# Patient Record
Sex: MALE | Race: WHITE | NOT HISPANIC OR LATINO | Employment: FULL TIME | URBAN - NONMETROPOLITAN AREA
[De-identification: names, ages, dates, MRNs, and addresses within clinical notes are randomized per-mention and may not be internally consistent; named-entity substitution may affect disease eponyms.]

---

## 2018-02-08 ENCOUNTER — DOCUMENTATION ONLY (OUTPATIENT)
Dept: FAMILY MEDICINE | Facility: OTHER | Age: 53
End: 2018-02-08

## 2018-02-08 PROBLEM — Z86.59 PERSONAL HISTORY OF OTHER MENTAL DISORDER: Status: ACTIVE | Noted: 2018-02-08

## 2018-02-08 PROBLEM — J30.9 ALLERGIC RHINITIS: Status: ACTIVE | Noted: 2018-02-08

## 2021-08-04 ENCOUNTER — TRANSFERRED RECORDS (OUTPATIENT)
Dept: HEALTH INFORMATION MANAGEMENT | Facility: CLINIC | Age: 56
End: 2021-08-04

## 2021-08-08 LAB
ALBUMIN UR-MCNC: NEGATIVE MG/DL
AMORPH CRY #/AREA URNS HPF: ABNORMAL /HPF
APPEARANCE UR: CLEAR
BACTERIA #/AREA URNS HPF: ABNORMAL /HPF
BILIRUB UR QL STRIP: NEGATIVE
COLOR UR AUTO: ABNORMAL
GLUCOSE UR STRIP-MCNC: 200 MG/DL
HGB UR QL STRIP: ABNORMAL
KETONES UR STRIP-MCNC: NEGATIVE MG/DL
LEUKOCYTE ESTERASE UR QL STRIP: NEGATIVE
MUCOUS THREADS #/AREA URNS LPF: PRESENT /LPF
NITRATE UR QL: NEGATIVE
PH UR STRIP: 5 [PH] (ref 5–7)
RBC URINE: 5 /HPF
SP GR UR STRIP: 1.01 (ref 1–1.03)
SQUAMOUS EPITHELIAL: <1 /HPF
UROBILINOGEN UR STRIP-MCNC: NORMAL MG/DL
WBC URINE: 6 /HPF

## 2021-08-08 PROCEDURE — 81001 URINALYSIS AUTO W/SCOPE: CPT | Performed by: EMERGENCY MEDICINE

## 2021-08-08 PROCEDURE — 99283 EMERGENCY DEPT VISIT LOW MDM: CPT

## 2021-08-09 ENCOUNTER — APPOINTMENT (OUTPATIENT)
Dept: GENERAL RADIOLOGY | Facility: CLINIC | Age: 56
End: 2021-08-09
Attending: EMERGENCY MEDICINE
Payer: COMMERCIAL

## 2021-08-09 ENCOUNTER — HOSPITAL ENCOUNTER (EMERGENCY)
Facility: CLINIC | Age: 56
Discharge: HOME | End: 2021-08-09
Attending: EMERGENCY MEDICINE | Admitting: EMERGENCY MEDICINE
Payer: COMMERCIAL

## 2021-08-09 VITALS
OXYGEN SATURATION: 94 % | DIASTOLIC BLOOD PRESSURE: 83 MMHG | HEART RATE: 90 BPM | RESPIRATION RATE: 18 BRPM | SYSTOLIC BLOOD PRESSURE: 124 MMHG | TEMPERATURE: 100 F

## 2021-08-09 DIAGNOSIS — U07.1 CLINICAL DIAGNOSIS OF COVID-19: ICD-10-CM

## 2021-08-09 DIAGNOSIS — R74.8 ELEVATED LIVER ENZYMES: ICD-10-CM

## 2021-08-09 DIAGNOSIS — N17.9 AKI (ACUTE KIDNEY INJURY) (H): ICD-10-CM

## 2021-08-09 LAB
ALBUMIN SERPL-MCNC: 3.8 G/DL (ref 3.4–5)
ALP SERPL-CCNC: 75 U/L (ref 40–150)
ALT SERPL W P-5'-P-CCNC: 95 U/L (ref 0–70)
ANION GAP SERPL CALCULATED.3IONS-SCNC: 7 MMOL/L (ref 3–14)
AST SERPL W P-5'-P-CCNC: 48 U/L (ref 0–45)
ATRIAL RATE - MUSE: 85 BPM
BASOPHILS # BLD AUTO: 0 10E3/UL (ref 0–0.2)
BASOPHILS NFR BLD AUTO: 1 %
BILIRUB SERPL-MCNC: 0.5 MG/DL (ref 0.2–1.3)
BUN SERPL-MCNC: 19 MG/DL (ref 7–30)
CALCIUM SERPL-MCNC: 9.2 MG/DL (ref 8.5–10.1)
CHLORIDE BLD-SCNC: 106 MMOL/L (ref 94–109)
CO2 SERPL-SCNC: 28 MMOL/L (ref 20–32)
CREAT SERPL-MCNC: 1.63 MG/DL (ref 0.66–1.25)
D DIMER PPP FEU-MCNC: 0.42 UG/ML FEU (ref 0–0.5)
DIASTOLIC BLOOD PRESSURE - MUSE: NORMAL MMHG
EOSINOPHIL # BLD AUTO: 0 10E3/UL (ref 0–0.7)
EOSINOPHIL NFR BLD AUTO: 1 %
ERYTHROCYTE [DISTWIDTH] IN BLOOD BY AUTOMATED COUNT: 12.6 % (ref 10–15)
GFR SERPL CREATININE-BSD FRML MDRD: 47 ML/MIN/1.73M2
GLUCOSE BLD-MCNC: 142 MG/DL (ref 70–99)
HCT VFR BLD AUTO: 48.5 % (ref 40–53)
HGB BLD-MCNC: 16.4 G/DL (ref 13.3–17.7)
IMM GRANULOCYTES # BLD: 0 10E3/UL
IMM GRANULOCYTES NFR BLD: 1 %
INTERPRETATION ECG - MUSE: NORMAL
LACTATE SERPL-SCNC: 1.3 MMOL/L (ref 0.7–2)
LIPASE SERPL-CCNC: 228 U/L (ref 73–393)
LYMPHOCYTES # BLD AUTO: 0.8 10E3/UL (ref 0.8–5.3)
LYMPHOCYTES NFR BLD AUTO: 24 %
MCH RBC QN AUTO: 31.2 PG (ref 26.5–33)
MCHC RBC AUTO-ENTMCNC: 33.8 G/DL (ref 31.5–36.5)
MCV RBC AUTO: 92 FL (ref 78–100)
MONOCYTES # BLD AUTO: 0.4 10E3/UL (ref 0–1.3)
MONOCYTES NFR BLD AUTO: 11 %
NEUTROPHILS # BLD AUTO: 2.2 10E3/UL (ref 1.6–8.3)
NEUTROPHILS NFR BLD AUTO: 62 %
NRBC # BLD AUTO: 0 10E3/UL
NRBC BLD AUTO-RTO: 0 /100
P AXIS - MUSE: 51 DEGREES
PLATELET # BLD AUTO: 93 10E3/UL (ref 150–450)
POTASSIUM BLD-SCNC: 4.1 MMOL/L (ref 3.4–5.3)
PR INTERVAL - MUSE: 152 MS
PROT SERPL-MCNC: 7.8 G/DL (ref 6.8–8.8)
QRS DURATION - MUSE: 88 MS
QT - MUSE: 340 MS
QTC - MUSE: 404 MS
R AXIS - MUSE: 57 DEGREES
RBC # BLD AUTO: 5.25 10E6/UL (ref 4.4–5.9)
SARS-COV-2 RNA RESP QL NAA+PROBE: POSITIVE
SODIUM SERPL-SCNC: 141 MMOL/L (ref 133–144)
SYSTOLIC BLOOD PRESSURE - MUSE: NORMAL MMHG
T AXIS - MUSE: 31 DEGREES
VENTRICULAR RATE- MUSE: 85 BPM
WBC # BLD AUTO: 3.5 10E3/UL (ref 4–11)

## 2021-08-09 PROCEDURE — 250N000013 HC RX MED GY IP 250 OP 250 PS 637: Performed by: EMERGENCY MEDICINE

## 2021-08-09 PROCEDURE — 83690 ASSAY OF LIPASE: CPT | Performed by: EMERGENCY MEDICINE

## 2021-08-09 PROCEDURE — 85025 COMPLETE CBC W/AUTO DIFF WBC: CPT | Performed by: EMERGENCY MEDICINE

## 2021-08-09 PROCEDURE — 83605 ASSAY OF LACTIC ACID: CPT | Performed by: EMERGENCY MEDICINE

## 2021-08-09 PROCEDURE — 71046 X-RAY EXAM CHEST 2 VIEWS: CPT

## 2021-08-09 PROCEDURE — 87635 SARS-COV-2 COVID-19 AMP PRB: CPT | Performed by: EMERGENCY MEDICINE

## 2021-08-09 PROCEDURE — 258N000003 HC RX IP 258 OP 636: Performed by: EMERGENCY MEDICINE

## 2021-08-09 PROCEDURE — 96374 THER/PROPH/DIAG INJ IV PUSH: CPT

## 2021-08-09 PROCEDURE — 36415 COLL VENOUS BLD VENIPUNCTURE: CPT | Performed by: EMERGENCY MEDICINE

## 2021-08-09 PROCEDURE — 84460 ALANINE AMINO (ALT) (SGPT): CPT | Performed by: EMERGENCY MEDICINE

## 2021-08-09 PROCEDURE — 93005 ELECTROCARDIOGRAM TRACING: CPT

## 2021-08-09 PROCEDURE — C9803 HOPD COVID-19 SPEC COLLECT: HCPCS

## 2021-08-09 PROCEDURE — 250N000011 HC RX IP 250 OP 636: Performed by: EMERGENCY MEDICINE

## 2021-08-09 PROCEDURE — 87040 BLOOD CULTURE FOR BACTERIA: CPT | Performed by: EMERGENCY MEDICINE

## 2021-08-09 PROCEDURE — 96361 HYDRATE IV INFUSION ADD-ON: CPT

## 2021-08-09 PROCEDURE — 85379 FIBRIN DEGRADATION QUANT: CPT | Performed by: EMERGENCY MEDICINE

## 2021-08-09 RX ORDER — ACETAMINOPHEN 325 MG/1
975 TABLET ORAL ONCE
Status: COMPLETED | OUTPATIENT
Start: 2021-08-09 | End: 2021-08-09

## 2021-08-09 RX ORDER — KETOROLAC TROMETHAMINE 15 MG/ML
10 INJECTION, SOLUTION INTRAMUSCULAR; INTRAVENOUS ONCE
Status: COMPLETED | OUTPATIENT
Start: 2021-08-09 | End: 2021-08-09

## 2021-08-09 RX ADMIN — ACETAMINOPHEN 975 MG: 325 TABLET, FILM COATED ORAL at 00:59

## 2021-08-09 RX ADMIN — KETOROLAC TROMETHAMINE 10 MG: 15 INJECTION, SOLUTION INTRAMUSCULAR; INTRAVENOUS at 00:58

## 2021-08-09 RX ADMIN — SODIUM CHLORIDE 1000 ML: 9 INJECTION, SOLUTION INTRAVENOUS at 00:56

## 2021-08-09 ASSESSMENT — ENCOUNTER SYMPTOMS
DIARRHEA: 1
FLANK PAIN: 1
FEVER: 1
VOMITING: 0
DYSURIA: 0
COUGH: 1
HEMATURIA: 0
BACK PAIN: 1
HEADACHES: 1

## 2021-08-09 NOTE — DISCHARGE INSTRUCTIONS
Discharge Instructions  COVID-19    COVID-19 is the disease caused by a new coronavirus. The virus spreads from person-to-person primarily by droplets when an infected person coughs or sneezes and the droplets are then breathed in by another person. There are tests available to diagnose COVID-19. You may have been diagnosed with COVID, may be being tested for COVID and have a pending test result, or may have been exposed to COVID.    Symptoms of COVID-19  Many people have no symptoms or mild symptoms.  Symptoms may usually appear 4 to 5 days (up to 14 days) after contact with a person with COVID-19. Some people will get severe symptoms and pneumonia. Usual symptoms are:     ? Fever  ? Cough  ? Trouble breathing    Less common symptoms are: Headache, body aches, sore throat, sneezing, diarrhea, loss of taste or smell.    Isolation and Quarantine    You may have been seen because you have symptoms, had an exposure, or had some other concern about possible COVID. The best way to stop the spread of the virus is to avoid contact with others.    Isolation refers to sick people staying away from people who are not sick. A person in quarantine is limiting activity because they were exposed and are waiting to see if they might become sick.    If you test positive for COVID, you should stay home (isolation) for at least 10 days after your symptoms began, and for 24 hours with no fever and improvement of symptoms--whichever is longer. (Your fever should be gone for 24 hours without using fever-reducing medicine). If you have no symptoms, you should stay home (isolation) for 10 days from the day of the test. If you have been vaccinated for COVID, the vaccination will not cause you to test positive so a positive test result generally is a  true positive .    For example, if you have a fever and cough for 6 days, you need to stay home 4 more days with no fever for a total of 10 days. Or, if you have a fever and cough for 10 days,  you need to stay home one more day with no fever for a total of 11 days.    If you have a high-risk exposure to COVID (you spent 15 minutes or more within six feet of somebody who has COVID), you should stay home (quarantine) for 14 days, unless you are vaccinated. Even if you test negative for COVID, the CDC recommends a 14-day quarantine from the time of your last exposure to that individual (unless you are vaccinated). There are options for a shortened (<14 day quarantine) you can review at:  [email or web address]/[email or web address]/close.html#long    If you live in the same house as somebody with COVID and cannot separate from them, you will need to quarantine for 14-days after that person's isolation (infectious) period. That means that you may need to quarantine for 24-days after that person became symptomatic/ill.    If you are vaccinated and do not develop symptoms, you do not need to quarantine after exposure.    If you have symptoms but a negative test, you should stay at home until you are symptom-free and without fever for 24 hours, using the same judgment you would for when it is safe to return to work/school from strep throat, influenza, or the common cold. If you worsen, you should consider being re-evaluated.    If you are being tested for COVID because of symptoms and your test is pending, you should stay home until you know your test result.    If I have COVID, how should I protect myself and others?    Do not go to work or school. Have a friend or relative do your shopping. Do not use public transportation (bus, train) or ridesharing (Lyft, Uber).    Separate yourself from other people in your home. As much as possible, you should stay in one room and away from other people in your home. Also, use a separate bathroom, if possible. Avoid handling pets or other animals while sick.     Wear a facemask if you need to be around other people and cover your mouth and nose with a tissue when  you cough or sneeze.     Avoid sharing personal household items. You should not share dishes, drinking glasses, forks/knives/spoons, towels, or bedding with other people in your home. After using these items, they should be washed with soap and water. Clean parts of your home that are touched often (doorknobs, faucets, countertops, etc.) daily.     Wash your hands often with soap and water for at least 20 seconds or use an alcohol-based hand  containing at least 60% alcohol.     Avoid touching your face.    Treat your symptoms. You can take Acetaminophen (Tylenol) to treat body aches and fever as needed for comfort. Ibuprofen (Advil or Motrin) can be used as well if you still have symptoms after taking Tylenol. Drink fluids. Rest.    Watch for worsening symptoms such as shortness of breath/difficulty breathing or very severe weakness.    Employers/workplaces are being asked by the Centers for Disease Control (CDC) to not request notes/documentation for you to return to work or prove that you were ill. You may choose to show your employer this paperwork. Also, repeat testing should not be required to return to work.    Exercise/Sports in rare cases, COVID could affect your heart in a way that makes exercise or participation in sports dangerous.    If you have a mild COVID illness (fever, cough, sore throat, and similar symptoms but no difficulty breathing or abnormalities of the lung): After your COVID symptoms have resolved, wait 14-days before returning to activity.  If you have more than a mild illness (meaning that you have problems with your breathing or lungs) or if you participate in competitive or strenuous activity or have a history of heart disease: Please see your primary doctor/provider prior to return to activity/competition.    Antibody treatments are available for patients with mild to moderate COVID illness in order to prevent severe illness. In general, only patients with risk factors for  severe illness are eligible for treatment. For more information, to see if you are eligible, and to find treatment, go to the [facility]:  [email or web address]     Return to the Emergency Department if:    If you are developing worsening breathing, shortness of breath, or feel worse you should seek medical attention.  If you are uncertain, contact your health care provider/clinic. If you need emergency medical attention, call 911 and tell them you have been ill.

## 2021-08-09 NOTE — ED TRIAGE NOTES
Patient presents to ED d/t R flank pain developed Thursday was in [place] and seen in ED for same c/o. Increased pain now radiating to lower abdomen. Fever, cough, diarrhea and HA.     Tested for COVID resulted negative    Took Tordaol at noon   Tylenol at 1700

## 2021-08-09 NOTE — ED PROVIDER NOTES
History   Chief Complaint:  Abdominal Pain and Flank Pain       HPI   Richard Knutson is a 56 year old male who presents with fever, cough, headache and bilateral flank pain for the past five days since Wednesday.  He noted he was in [place] when his symptoms began and he was seen in the emergency department in [place], receiving a chest x ray and CT scan that came back normal. See results below.  He also tested negative for Covid. He noted he was started on Augmentin but his symptoms have persisted. He also complained of bilateral back pain and multiple episodes of loose stools. Denied dysuria or hematuria or sick contacts. Denied vomiting or visual disturbance. He is not covid vaccinated.      8/4 CT Stone Protocol Results:  1. No urinary stones or obstruction.  2. Hepatic steatosis.  Reading per radiology    Review of Systems   Constitutional: Positive for fever.   Eyes: Negative for visual disturbance.   Respiratory: Positive for cough.    Gastrointestinal: Positive for diarrhea. Negative for vomiting.   Genitourinary: Positive for flank pain. Negative for dysuria and hematuria.   Musculoskeletal: Positive for back pain.   Neurological: Positive for headaches.   All other systems reviewed and are negative.        Allergies:  Apple  Other  Peanuts   Plum Pulp    Medications:  Vitamin D    Past Medical History:    Dysphagia  Gastritis  Hiatal hernia  Allergies  Mental disorder  Migraine  BPPV  ASNHL  Motion sickness     Family History:    Heart disease  Colon cancer    Social History:  Presents to the ED alone.  Recently in [place].    Physical Exam     Patient Vitals for the past 24 hrs:   BP Temp Pulse Resp SpO2   08/09/21 0230 124/83 -- 90 -- 94 %   08/09/21 0200 134/81 -- 88 -- 94 %   08/09/21 0150 125/79 -- 87 -- 94 %   08/09/21 0045 136/86 -- 92 -- 95 %   08/08/21 2142 (!) 140/81 100  F (37.8  C) 86 18 100 %       Physical Exam  Constitutional:  Oriented to person, place, and time. Nontoxic  appearing.  HENT:   Head:    Normocephalic.   Mouth/Throat:   Oropharynx is clear and moist.   Eyes:    EOM are normal. Pupils are equal, round, and reactive to light.   Neck:    Neck supple.   Cardiovascular:  Normal rate, regular rhythm and normal heart sounds.      Exam reveals no gallop and no friction rub.       No murmur heard.  Pulmonary/Chest:  Effort normal and breath sounds normal.      No respiratory distress. No wheezes. No rales.      No reproducible chest wall pain.  Abdominal:   No pain on percussion of flanks.  Musculoskeletal:  Normal range of motion.   Neurological:   Alert and oriented to person, place, and time.           Moves all 4 extremities spontaneously    Skin:    No rash noted. No tick bites.    Emergency Department Course   ECG  ECG taken at 107, ECG read at 107  Normal sinus rhythm  Normal ECG   Rate 85 bpm. AK interval 152 ms. QRS duration 88 ms. QT/QTc 340/404 ms. P-R-T axes 51 57 31.     Imaging:  Chest XR,  PA & LAT  IMPRESSION: No infiltrates or effusions. No pneumothorax. Minimal aortic calcification. Normal heart size and pulmonary vascularity. Minimal elevation right hemidiaphragm. Minor degenerative changes in the spine.  Reading per radiology    Laboratory:  CBC: WBC 3.5 (L), HGB 16.4, PLT 93 (L)     CMP: glucose: 142 (H), AST: 48 (H), GFR: 47 (L) o/w WNL (Creatinine 1.63 (H))     Lactic acid (result time 0042) 1.3     Lipase: 228    D dimer: 0.42    UA with microscopic: glucose: 200 (A), blood: moderate (A), bacteria: few (A), RBC: 5 (H), WBC: 6 (H), mucus: present (A), amorphous crystals: few (A) o/w WNL     Symptomatic COVID-19 Virus (Coronavirus) by PCR Nasopharyngeal swab: Positive (A)    Emergency Department Course:    Reviewed:  I reviewed nursing notes, vitals, past medical history and care everywhere    Assessments:  2330 I obtained history and examined the patient as noted above.   230 I rechecked the patient and explained findings.     Interventions:  0056 NS 1 L  IV  0058 Toradol 10 mg IV  0059 Tylenol 975 mg PO    Disposition:  The patient was discharged to home.       Impression & Plan     Medical Decision Making:  Richard Knutson is a 56 year old male who came in with cough, flank pain, myalgias, and headache. Workup was done in [place] with CT of pelvis with no obvious findings and negative Covid test. Here, he has continued symptoms. My differential includes pyelonephritis, renal colic, tick bite, Covid 19, pneumonia, and other causes. Covid test here is positive consistent with symptoms, although he has no obvious findings on chest x ray. With flank pain, I ordered a d dimer for possible PE which is negative. He is otherwise low risk. I see no need to repeat CT at this time. He does have mildly elevated creatinine and liver enzymes and has been told to discontinue Toradol in regards to this. At this time he is otherwise appropriate for outpatient management. He will be discharged home with pulse ox and will get a call back in get well loop. Patient will return with any worsening symptoms.       Covid-19  Richard Knutson was evaluated during a global COVID-19 pandemic, which necessitated consideration that the patient might be at risk for infection with the SARS-CoV-2 virus that causes COVID-19.   Applicable protocols for evaluation were followed during the patient's care.   COVID-19 was considered as part of the patient's evaluation. The plan for testing is:  a test was obtained during this visit.    Diagnosis:    ICD-10-CM    1. Clinical diagnosis of COVID-19  U07.1 COVID-19 GetWell Loop Referral   2. ERIN (acute kidney injury) (H)  N17.9    3. Elevated liver enzymes  R74.8        Scribe Disclosure:  Tara SWESNON, am serving as a scribe at 1:08 AM on 8/9/2021 to document services personally performed by Manjeet Baca MD based on my observations and the provider's statements to me.              Manjeet Baca MD  08/09/21 2154

## 2021-08-14 LAB
BACTERIA BLD CULT: NO GROWTH
BACTERIA BLD CULT: NO GROWTH

## 2024-06-01 ENCOUNTER — OFFICE VISIT (OUTPATIENT)
Dept: FAMILY MEDICINE | Facility: OTHER | Age: 59
End: 2024-06-01
Attending: NURSE PRACTITIONER
Payer: COMMERCIAL

## 2024-06-01 VITALS
HEIGHT: 65 IN | RESPIRATION RATE: 18 BRPM | HEART RATE: 72 BPM | BODY MASS INDEX: 25.18 KG/M2 | SYSTOLIC BLOOD PRESSURE: 120 MMHG | WEIGHT: 151.1 LBS | TEMPERATURE: 97 F | DIASTOLIC BLOOD PRESSURE: 90 MMHG | OXYGEN SATURATION: 97 %

## 2024-06-01 DIAGNOSIS — J20.9 ACUTE BRONCHITIS, UNSPECIFIED ORGANISM: Primary | ICD-10-CM

## 2024-06-01 DIAGNOSIS — J30.89 SEASONAL ALLERGIC RHINITIS DUE TO OTHER ALLERGIC TRIGGER: ICD-10-CM

## 2024-06-01 DIAGNOSIS — E11.9 TYPE 2 DIABETES MELLITUS WITHOUT COMPLICATION, WITHOUT LONG-TERM CURRENT USE OF INSULIN (H): ICD-10-CM

## 2024-06-01 DIAGNOSIS — R05.1 ACUTE COUGH: ICD-10-CM

## 2024-06-01 PROCEDURE — 99204 OFFICE O/P NEW MOD 45 MIN: CPT | Performed by: NURSE PRACTITIONER

## 2024-06-01 RX ORDER — MULTIVIT-MIN/IRON/FOLIC ACID/K 18-600-40
CAPSULE ORAL
COMMUNITY

## 2024-06-01 RX ORDER — ALBUTEROL SULFATE 90 UG/1
2 AEROSOL, METERED RESPIRATORY (INHALATION) EVERY 4 HOURS PRN
Qty: 18 G | Refills: 1 | Status: SHIPPED | OUTPATIENT
Start: 2024-06-01

## 2024-06-01 RX ORDER — BENZONATATE 100 MG/1
100 CAPSULE ORAL 3 TIMES DAILY PRN
Qty: 42 CAPSULE | Refills: 0 | Status: SHIPPED | OUTPATIENT
Start: 2024-06-01

## 2024-06-01 RX ORDER — PREDNISONE 20 MG/1
40 TABLET ORAL EVERY MORNING
Qty: 6 TABLET | Refills: 0 | Status: SHIPPED | OUTPATIENT
Start: 2024-06-01 | End: 2024-06-04

## 2024-06-01 ASSESSMENT — PAIN SCALES - GENERAL: PAINLEVEL: NO PAIN (0)

## 2024-06-01 NOTE — PATIENT INSTRUCTIONS
Adequate rest, staying hydrated with water and electrolyte replacements and symptomatic care is recommended for treatment of viral illness    -Over the counter remedies for upper respiratory symptoms. Please use caution with certain over the counter cough and cold medicine if you are on cardiac medications or blood thinners.    -Sinus congestion/pressure & runny nose   Flonase nasal spray, twice daily.  This is an intranasal steroid.  This will help with sinus congestion and postnasal drip. This is safe to use through the length of your symptoms.  Afrin nasal spray is also an option.  This is a little bit stronger. I would not recommend using this longer than 3 days as this and cause rebound congestion.  Consider picking up a daily nondrowsy antihistamine such as Allegra, Claritin or Zyrtec.  I would recommend taking this once daily for the next 3-5 days to help with symptoms. This can help with sinus drainage and  nasal congestion/pressure. Improving postnasal drip can help reduce sore throat irritation and reduce drainage causing a cough.    -Stay well hydrated. Push water. If you need to flavor the water that is ok.  Caffeine does not help with dehydration, this actually worsens dehydration.  Avoid pop and coffee.  -Adequate rest. Please allow your body adequate time to recover and heal from infection. Many viral illnesses last 7-10 days.   -Tylenol (acetaminophen) and NSAIDS (Aleve, Advil, ibuprofen) as needed for pain, discomfort, sore throat, headache, fever, chills or body aches.  Ibuprofen 400 mg-600 mg every 6 hours as needed pain/headache or fever.  Acetaminophen  (Tylenol) 500 mg or 650 mg every 4 hours.  For severe pain may use extra strength acetaminophen 1000 mg every 8 hours.   -Many viral illness last longer than a week, up to 10 Days. If you are having difficulty breathing, shortness of breath, wheezing, developing a fever again later in your illness, coughing up blood or tan colored sputum please  let us know.

## 2024-06-01 NOTE — PROGRESS NOTES
Richard Knutson  1965    ASSESSMENT/PLAN  1. Acute cough  with  2. Acute bronchitis, unspecified organism  - albuterol (PROAIR HFA/PROVENTIL HFA/VENTOLIN HFA) 108 (90 Base) MCG/ACT inhaler; Inhale 2 puffs into the lungs every 4 hours as needed for cough or shortness of breath  Dispense: 18 g; Refill: 1  - predniSONE (DELTASONE) 20 MG tablet; Take 2 tablets (40 mg) by mouth every morning for 3 days  Dispense: 6 tablet; Refill: 0  - benzonatate (TESSALON) 100 MG capsule; Take 1 capsule (100 mg) by mouth 3 times daily as needed for cough  Dispense: 42 capsule; Refill: 0    Patient appears well, nontoxic.  Lungs are clear to auscultation, no wheezing or rhonchi.  Vital signs are stable, I have low suspicion for secondary bacterial pneumonia, x-ray not indicated at this time.  Recommend treatment option for acute bronchitis secondary to recent URI.  I do suspect his seasonal allergies are contributing factor to his symptoms as well.  Stressed the importance of once daily nondrowsy histamine such as Claritin or Zyrtec.  I do think he would benefit from a prednisone burst 40 mg once daily for 3 days, patient is agreeable and was, adverse side effects reviewed together.  Prescription for albuterol inhaler and how to properly use this reviewed with patient as well as prescription for Tessalon Perles to use as needed for cough.  Patient is agreeable to plan of care.  If symptoms or not improving over the next 5 to 7 days, or worsening he knows to reach out for sooner evaluation.    3. Seasonal allergic rhinitis due to other allergic trigger  Recommend daily nondrowsy histamine for the next 7 to 10 days and through peak allergy season.  May use Flonase nasal spray twice daily as well.    4.  Type 2 diabetes mellitus without complication  Last hemoglobin A1c from 1 year ago was 6.4.  Managed with healthy lifestyle.  Not on diabetic medications.  Patient is due for follow-up however his PCP at [facility] has retired.  I do  recommend he establish with new PCP [facility] or [facility], provider list at [facility] provided to patient to schedule follow-up for annual physical.    Explanation of diagnosis, treatment options and risk and benefits of medications reviewed with patient. Patient agrees with plan of care.  All questions were answered.  Red flags symptoms were discussed and patient was advised when they should return for reevaluation or for prompt emergency evaluation.   Patient was given verbal and written instructions on plan of care. Instructions were printed or are available on Mychart on electronic AVS.     SUBJECTIVE  CHIEF COMPLAINT/ REASON FOR VISIT  Patient presents with:  Cough  Headache     HISTORY OF PRESENT ILLNESS  Richard Knutson is a pleasant 59 year old male presents to clinic today for evaluation for cough.  Patient states he feels like his lungs are irritated.  He has felt heaviness in his chest especially takes a deep breath for the past week.  He denies shortness of breath but just feels like he cannot breathe as easy.  His cough has been predominantly dry cough, and is persistent throughout the day.  He has had some headaches.  Patient denies any nasal congestion, sore throat or other URI symptoms, and otherwise feels fine.  He is concerned as he works in a slick environment as a .  Currently at his job site he is wearing a respirator mask.  He notes he has a history of allergies and in the past did require allergy injections.  He has not been on these for years but uses Claritin as needed.  He is not currently taking this.  No fever, chills or bodyaches.    History of diabetes mellitus, not on any diabetic medications.    I have reviewed the nursing notes.  I have reviewed allergies, medication list, problem list, and past medical history.    REVIEW OF SYSTEMS  Review of Systems   All other systems reviewed and are negative.       VITAL SIGNS  Vitals:    06/01/24 0959   BP: (!)  "120/90   BP Location: Left arm   Patient Position: Sitting   Cuff Size: Adult Regular   Pulse: 72   Resp: 18   Temp: 97  F (36.1  C)   TempSrc: Tympanic   SpO2: 97%   Weight: 68.5 kg (151 lb 1.6 oz)   Height: 1.66 m (5' 5.35\")      Body mass index is 24.87 kg/m .    OBJECTIVE  PHYSICAL EXAM  Physical Exam  Vitals reviewed.   Constitutional:       Appearance: Normal appearance. He is not ill-appearing or toxic-appearing.   HENT:      Head: Normocephalic and atraumatic.      Nose: Nose normal. No congestion or rhinorrhea.      Mouth/Throat:      Pharynx: No oropharyngeal exudate or posterior oropharyngeal erythema.   Eyes:      Conjunctiva/sclera: Conjunctivae normal.   Cardiovascular:      Rate and Rhythm: Normal rate and regular rhythm.      Pulses: Normal pulses.      Heart sounds: Normal heart sounds. No murmur heard.  Pulmonary:      Effort: Pulmonary effort is normal.      Breath sounds: Normal breath sounds. No wheezing or rhonchi.   Musculoskeletal:      Cervical back: Neck supple. No tenderness.   Lymphadenopathy:      Cervical: No cervical adenopathy.   Skin:     Findings: No rash.   Neurological:      General: No focal deficit present.      Mental Status: He is alert and oriented to person, place, and time.   Psychiatric:         Mood and Affect: Mood normal.         Behavior: Behavior normal.         Thought Content: Thought content normal.         Judgment: Judgment normal.          DIAGNOSTICS  No results found for any visits on 06/01/24.     Landy Raphael NP  [facility]  "

## 2024-06-01 NOTE — NURSING NOTE
"Chief Complaint   Patient presents with    Cough    Headache       Chest tightness and lungs not feeling normal per pateint.     Initial There were no vitals taken for this visit. Estimated body mass index is 23.89 kg/m  as calculated from the following:    Height as of 1/14/16: 1.676 m (5' 6\").    Weight as of 1/14/16: 67.1 kg (148 lb).  Medication Review: complete    The next two questions are to help us understand your food security.  If you are feeling you need any assistance in this area, we have resources available to support you today.           No data to display                  Health Care Directive:  Patient does not have a Health Care Directive or Living Will: Discussed advance care planning with patient; however, patient declined at this time.    Itzel Angeles    Chief Complaint   Patient presents with    Cough    Headache       Initial There were no vitals taken for this visit. Estimated body mass index is 23.89 kg/m  as calculated from the following:    Height as of 1/14/16: 1.676 m (5' 6\").    Weight as of 1/14/16: 67.1 kg (148 lb).  Medication Reconciliation: complete      FOOD SECURITY SCREENING QUESTIONS:    The next two questions are to help us understand your food security.  If you are feeling you need any assistance in this area, we have resources available to support you today.    Hunger Vital Signs:  Within the past 12 months we worried whether our food would run out before we got money to buy more. Never  Within the past 12 months the food we bought just didn't last and we didn't have money to get more. Never          Itzel Angeles RN.......6/1/20249:57 AM     "

## 2024-06-11 ENCOUNTER — APPOINTMENT (OUTPATIENT)
Dept: URGENT CARE | Facility: CLINIC | Age: 59
End: 2024-06-11

## 2024-06-11 ENCOUNTER — APPOINTMENT (OUTPATIENT)
Dept: LAB | Facility: CLINIC | Age: 59
End: 2024-06-11

## 2024-06-30 ENCOUNTER — OFFICE VISIT (OUTPATIENT)
Dept: FAMILY MEDICINE | Facility: OTHER | Age: 59
End: 2024-06-30
Payer: COMMERCIAL

## 2024-06-30 ENCOUNTER — HOSPITAL ENCOUNTER (OUTPATIENT)
Dept: GENERAL RADIOLOGY | Facility: OTHER | Age: 59
Discharge: HOME | End: 2024-06-30
Payer: COMMERCIAL

## 2024-06-30 VITALS
HEART RATE: 76 BPM | RESPIRATION RATE: 16 BRPM | SYSTOLIC BLOOD PRESSURE: 138 MMHG | WEIGHT: 153.1 LBS | OXYGEN SATURATION: 96 % | DIASTOLIC BLOOD PRESSURE: 80 MMHG | TEMPERATURE: 97.8 F | BODY MASS INDEX: 25.51 KG/M2 | HEIGHT: 65 IN

## 2024-06-30 DIAGNOSIS — J06.9 UPPER RESPIRATORY TRACT INFECTION, UNSPECIFIED TYPE: Primary | ICD-10-CM

## 2024-06-30 DIAGNOSIS — E11.9 TYPE 2 DIABETES MELLITUS WITHOUT COMPLICATION, WITHOUT LONG-TERM CURRENT USE OF INSULIN (H): ICD-10-CM

## 2024-06-30 DIAGNOSIS — G44.219 EPISODIC TENSION-TYPE HEADACHE, NOT INTRACTABLE: ICD-10-CM

## 2024-06-30 DIAGNOSIS — R09.89 CHEST CONGESTION: ICD-10-CM

## 2024-06-30 DIAGNOSIS — R05.1 ACUTE COUGH: ICD-10-CM

## 2024-06-30 LAB
ANION GAP SERPL CALCULATED.3IONS-SCNC: 10 MMOL/L (ref 7–15)
BASOPHILS # BLD AUTO: 0.1 10E3/UL (ref 0–0.2)
BASOPHILS NFR BLD AUTO: 1 %
BUN SERPL-MCNC: 16.4 MG/DL (ref 6–20)
CALCIUM SERPL-MCNC: 9.9 MG/DL (ref 8.6–10)
CHLORIDE SERPL-SCNC: 101 MMOL/L (ref 98–107)
CREAT SERPL-MCNC: 1 MG/DL (ref 0.67–1.17)
DEPRECATED HCO3 PLAS-SCNC: 26 MMOL/L (ref 22–29)
EGFRCR SERPLBLD CKD-EPI 2021: 87 ML/MIN/1.73M2
EOSINOPHIL # BLD AUTO: 0.6 10E3/UL (ref 0–0.7)
EOSINOPHIL NFR BLD AUTO: 8 %
ERYTHROCYTE [DISTWIDTH] IN BLOOD BY AUTOMATED COUNT: 12.9 % (ref 10–15)
GLUCOSE SERPL-MCNC: 153 MG/DL (ref 70–99)
HCT VFR BLD AUTO: 47.2 % (ref 40–53)
HGB BLD-MCNC: 16.1 G/DL (ref 13.3–17.7)
IMM GRANULOCYTES # BLD: 0 10E3/UL
IMM GRANULOCYTES NFR BLD: 0 %
LYMPHOCYTES # BLD AUTO: 2.3 10E3/UL (ref 0.8–5.3)
LYMPHOCYTES NFR BLD AUTO: 30 %
MCH RBC QN AUTO: 31.5 PG (ref 26.5–33)
MCHC RBC AUTO-ENTMCNC: 34.1 G/DL (ref 31.5–36.5)
MCV RBC AUTO: 92 FL (ref 78–100)
MONOCYTES # BLD AUTO: 0.7 10E3/UL (ref 0–1.3)
MONOCYTES NFR BLD AUTO: 9 %
NEUTROPHILS # BLD AUTO: 4 10E3/UL (ref 1.6–8.3)
NEUTROPHILS NFR BLD AUTO: 52 %
NRBC # BLD AUTO: 0 10E3/UL
NRBC BLD AUTO-RTO: 0 /100
PLATELET # BLD AUTO: 162 10E3/UL (ref 150–450)
POTASSIUM SERPL-SCNC: 4 MMOL/L (ref 3.4–5.3)
RBC # BLD AUTO: 5.11 10E6/UL (ref 4.4–5.9)
SODIUM SERPL-SCNC: 137 MMOL/L (ref 135–145)
WBC # BLD AUTO: 7.7 10E3/UL (ref 4–11)

## 2024-06-30 PROCEDURE — 80048 BASIC METABOLIC PNL TOTAL CA: CPT | Mod: ZL

## 2024-06-30 PROCEDURE — 85025 COMPLETE CBC W/AUTO DIFF WBC: CPT | Mod: ZL

## 2024-06-30 PROCEDURE — 71046 X-RAY EXAM CHEST 2 VIEWS: CPT

## 2024-06-30 PROCEDURE — 36415 COLL VENOUS BLD VENIPUNCTURE: CPT | Mod: ZL

## 2024-06-30 PROCEDURE — 99214 OFFICE O/P EST MOD 30 MIN: CPT

## 2024-06-30 RX ORDER — AZITHROMYCIN 250 MG/1
TABLET, FILM COATED ORAL
Qty: 6 TABLET | Refills: 0 | Status: SHIPPED | OUTPATIENT
Start: 2024-06-30 | End: 2024-07-05

## 2024-06-30 RX ORDER — AMOXICILLIN 500 MG
1200 CAPSULE ORAL DAILY
COMMUNITY

## 2024-06-30 ASSESSMENT — PAIN SCALES - GENERAL: PAINLEVEL: EXTREME PAIN (9)

## 2024-06-30 NOTE — PROGRESS NOTES
ASSESSMENT/PLAN:    I have reviewed the nursing notes.  I have reviewed the findings, diagnosis, plan and need for follow up with the patient.    1. Chest congestion  2. Acute cough  3. Episodic tension-type headache, not intractable    - XR Chest 2 Views- Clear chest xray per radiologist    - CBC with Platelets & Differential- Unremarkable results    - Basic Metabolic Panel- Unremarkable results other than an elevated glucose of 153.    4. Upper respiratory tract infection, unspecified type    - azithromycin (ZITHROMAX) 250 MG tablet; Take 2 tablets (500 mg) by mouth daily for 1 day, THEN 1 tablet (250 mg) daily for 4 days.  Dispense: 6 tablet; Refill: 0    - Please read the attached information on URI and cough for at home care treatment as discussed in the clinic today.    - Symptomatic treatment - Encouraged fluids, salt water gargles, honey (only if greater than 1 year in age due to risk of botulism), elevation, humidifier, sinus rinse/netti pot, lozenges, tea, topical vapor rub, popsicles, rest, etc     - May use over-the-counter Tylenol or ibuprofen as needed for pain, inflammation or fever.    5. Type 2 diabetes mellitus without complication, without long-term current use of insulin (H)    - Patient A1C on 7/13/24 was 6.4.  Patient is currently not taking an medication to control his diabetes.  Recommend follow up with PCP for plan to better manage his diabetes.      - Discussed warning signs/symptoms indicative of need to f/u    - Follow up if symptoms persist or worsen or concerns    - I explained my diagnostic considerations and recommendations to the patient, who voiced understanding and agreement with the treatment plan. All questions were answered. We discussed potential side effects of any prescribed or recommended therapies, as well as expectations for response to treatments.    Elba Garrett, DUY CNP  6/30/2024  4:52 PM    HPI:    Richard Knutson is a 59 year old male who presents to [facility]  today for concerns of productive cough with chest tightness.  Coughing up green sputum.  Has had symptoms since the beginning of June.  Was seen in  on 6/01/24.  States that he had gotten a little better a couple weeks ago but is now getting worse again. Denies fever, shortness of breath, chest pain, rhinorrhea, nasal congestion, headache, dizziness, otalgia, nausea, vomiting or diarrhea.     Past Medical History:   Diagnosis Date    Dysphagia 8/4/14    Epigastric pain 8/4/14    Gastritis 8/4/14    Hiatal hernia 8/4/14     History reviewed. No pertinent surgical history.  Social History     Tobacco Use    Smoking status: Never    Smokeless tobacco: Never   Substance Use Topics    Alcohol use: Not Currently     Current Outpatient Medications   Medication Sig Dispense Refill    albuterol (PROAIR HFA/PROVENTIL HFA/VENTOLIN HFA) 108 (90 Base) MCG/ACT inhaler Inhale 2 puffs into the lungs every 4 hours as needed for cough or shortness of breath 18 g 1    benzonatate (TESSALON) 100 MG capsule Take 1 capsule (100 mg) by mouth 3 times daily as needed for cough 42 capsule 0    Cholecalciferol (VITAMIN D) 50 MCG (2000 UT) CAPS Take  by mouth. 1 unit of Vitamin D equals 0.025 mcg of Vitamin D      IBUPROFEN PO Take 200 mg by mouth every 6 hours as needed for moderate pain      Multiple Vitamin (MULTIVITAMIN ADULT PO) Take 1 tablet by mouth daily      Omega-3 Fatty Acids (FISH OIL) 1200 MG capsule Take 1,200 mg by mouth daily      scopolamine (TRANSDERM) (1.5mg base/3day) patch Place 1 patch onto the skin every 72 hours.  Apply to hairless area behind one ear at least 4 hours before travel.  Remove old patch and change every 3 days. (Patient not taking: Reported on 6/30/2024) 2 patch 10     Allergies   Allergen Reactions    Food Anaphylaxis     Peanuts, raw vegetables (carrots), and apples, plums, anything with a skin on it.    Other Environmental Allergy      Other Reaction(s): Sneezing    Hay fever, and pollens in the  "Spring.    Apple Juice     Other [Seasonal Allergies]      Raw vegetables and anything with a skin.    Peanuts [Nuts]     Plum Pulp      Plums    Lisinopril Other (See Comments)     Cysts; headaches    Statins Other (See Comments)     Statin Intolerance Documentation   2. Shared decision making discussion with patient regarding statins and patient chooses to not trial a second statin.   Elevated liver enzymes     Past medical history, past surgical history, current medications and allergies reviewed and accurate to the best of my knowledge.      ROS:  Refer to HPI    /80   Pulse 76   Temp 97.8  F (36.6  C) (Tympanic)   Resp 16   Ht 1.657 m (5' 5.25\")   Wt 69.4 kg (153 lb 1.6 oz)   SpO2 96%   BMI 25.28 kg/m      EXAM:  General Appearance: Well appearing 59 year old male, appropriate appearance for age. No acute distress   Ears: Left TM intact, translucent with bony landmarks appreciated, no erythema, no effusion, no bulging, no purulence.  Right TM intact, translucent with bony landmarks appreciated, no erythema, no effusion, no bulging, no purulence.  Left auditory canal clear.  Right auditory canal clear.  Normal external ears, non tender.  Eyes: conjunctivae normal without erythema or irritation, corneas clear, no drainage or crusting, no eyelid swelling, pupils equal   Oropharynx: moist mucous membranes, posterior pharynx without erythema, tonsils symmetric, no erythema, no exudates or petechiae, no post nasal drip seen, no trismus, voice clear.    Sinuses:  No sinus tenderness upon palpation of the frontal or maxillary sinuses  Nose:  Bilateral nares: no erythema, no edema, no drainage or congestion   Neck: supple without adenopathy  Respiratory: normal chest wall and respirations.  Normal effort.  Clear to auscultation bilaterally, no wheezing, crackles or rhonchi.  No increased work of breathing.  Harsh cough appreciated.  Cardiac: RRR with no murmurs  Musculoskeletal:  Equal movement of bilateral " upper extremities.  Equal movement of bilateral lower extremities.  Normal gait.    Neuro: Alert and oriented to person, place, and time.    Psychological: normal affect, alert, oriented, and pleasant.     Labs: Xray:  Results for orders placed or performed in visit on 06/30/24   XR Chest 2 Views     Status: None    Narrative    Exam:  XR CHEST 2 VIEWS    HISTORY: Chest congestion; Acute cough.    COMPARISON:  8/9/2021    FINDINGS:     The cardiomediastinal contours are normal.      No focal consolidation, effusion, or pneumothorax.      No acute osseous abnormality.       Impression    IMPRESSION:      No acute cardiopulmonary process.      ASHLI BACA MD         SYSTEM ID:  [id number]   Basic Metabolic Panel     Status: Abnormal   Result Value Ref Range    Sodium 137 135 - 145 mmol/L    Potassium 4.0 3.4 - 5.3 mmol/L    Chloride 101 98 - 107 mmol/L    Carbon Dioxide (CO2) 26 22 - 29 mmol/L    Anion Gap 10 7 - 15 mmol/L    Urea Nitrogen 16.4 6.0 - 20.0 mg/dL    Creatinine 1.00 0.67 - 1.17 mg/dL    GFR Estimate 87 >60 mL/min/1.73m2    Calcium 9.9 8.6 - 10.0 mg/dL    Glucose 153 (H) 70 - 99 mg/dL   CBC with platelets and differential     Status: None   Result Value Ref Range    WBC Count 7.7 4.0 - 11.0 10e3/uL    RBC Count 5.11 4.40 - 5.90 10e6/uL    Hemoglobin 16.1 13.3 - 17.7 g/dL    Hematocrit 47.2 40.0 - 53.0 %    MCV 92 78 - 100 fL    MCH 31.5 26.5 - 33.0 pg    MCHC 34.1 31.5 - 36.5 g/dL    RDW 12.9 10.0 - 15.0 %    Platelet Count 162 150 - 450 10e3/uL    % Neutrophils 52 %    % Lymphocytes 30 %    % Monocytes 9 %    % Eosinophils 8 %    % Basophils 1 %    % Immature Granulocytes 0 %    NRBCs per 100 WBC 0 <1 /100    Absolute Neutrophils 4.0 1.6 - 8.3 10e3/uL    Absolute Lymphocytes 2.3 0.8 - 5.3 10e3/uL    Absolute Monocytes 0.7 0.0 - 1.3 10e3/uL    Absolute Eosinophils 0.6 0.0 - 0.7 10e3/uL    Absolute Basophils 0.1 0.0 - 0.2 10e3/uL    Absolute Immature Granulocytes 0.0 <=0.4 10e3/uL    Absolute NRBCs 0.0  10e3/uL   CBC with Platelets & Differential     Status: None    Narrative    The following orders were created for panel order CBC with Platelets & Differential.  Procedure                               Abnormality         Status                     ---------                               -----------         ------                     CBC with platelets and d...[[id number]]                      Final result                 Please view results for these tests on the individual orders.

## 2024-06-30 NOTE — NURSING NOTE
"Chief Complaint   Patient presents with    RECHECK     Productive cough green/yellow mucous, chest tightness    Headache       Initial /80   Pulse 76   Temp 97.8  F (36.6  C) (Tympanic)   Resp 16   Ht 1.657 m (5' 5.25\")   Wt 69.4 kg (153 lb 1.6 oz)   SpO2 96%   BMI 25.28 kg/m   Estimated body mass index is 25.28 kg/m  as calculated from the following:    Height as of this encounter: 1.657 m (5' 5.25\").    Weight as of this encounter: 69.4 kg (153 lb 1.6 oz).  Medication Review: complete    The next two questions are to help us understand your food security.  If you are feeling you need any assistance in this area, we have resources available to support you today.          6/1/2024   SDOH- Food Insecurity   Within the past 12 months, did you worry that your food would run out before you got money to buy more? N   Within the past 12 months, did the food you bought just not last and you didn t have money to get more? N            Health Care Directive:  Patient does not have a Health Care Directive or Living Will: Discussed advance care planning with patient; however, patient declined at this time.    Norma J. Gosselin, LPN      "

## 2024-07-01 ENCOUNTER — TRANSFERRED RECORDS (OUTPATIENT)
Dept: MULTI SPECIALTY CLINIC | Facility: CLINIC | Age: 59
End: 2024-07-01
Payer: COMMERCIAL

## 2024-07-01 LAB
ALBUMIN (URINE) MG/SPEC: 0.9 MG/DL
CHOLESTEROL (EXTERNAL): 267 MG/DL (ref 0–200)
CREATININE (URINE): 123 MG/DL
HBA1C MFR BLD: 7.3 % (ref 4–5.6)
HDLC SERPL-MCNC: 55 MG/DL
LDL CHOLESTEROL CALCULATED (EXTERNAL): 177 MG/DL (ref 0–100)
NON HDL CHOLESTEROL (EXTERNAL): 212 MG/DL (ref 0–130)
TRIGLYCERIDES (EXTERNAL): 175 MG/DL

## 2024-07-07 ENCOUNTER — HEALTH MAINTENANCE LETTER (OUTPATIENT)
Age: 59
End: 2024-07-07

## 2024-11-24 ENCOUNTER — HEALTH MAINTENANCE LETTER (OUTPATIENT)
Age: 59
End: 2024-11-24

## 2024-12-19 ENCOUNTER — APPOINTMENT (OUTPATIENT)
Dept: CT IMAGING | Facility: OTHER | Age: 59
End: 2024-12-19
Attending: FAMILY MEDICINE
Payer: COMMERCIAL

## 2024-12-19 ENCOUNTER — HOSPITAL ENCOUNTER (EMERGENCY)
Facility: OTHER | Age: 59
End: 2024-12-19
Attending: FAMILY MEDICINE
Payer: COMMERCIAL

## 2024-12-19 VITALS
TEMPERATURE: 98 F | RESPIRATION RATE: 18 BRPM | WEIGHT: 153 LBS | OXYGEN SATURATION: 99 % | HEART RATE: 72 BPM | DIASTOLIC BLOOD PRESSURE: 93 MMHG | SYSTOLIC BLOOD PRESSURE: 144 MMHG | BODY MASS INDEX: 25.27 KG/M2

## 2024-12-19 DIAGNOSIS — R10.9 LEFT FLANK PAIN: ICD-10-CM

## 2024-12-19 DIAGNOSIS — N20.1 URETEROLITHIASIS: ICD-10-CM

## 2024-12-19 LAB
ALBUMIN UR-MCNC: 10 MG/DL
ANION GAP SERPL CALCULATED.3IONS-SCNC: 13 MMOL/L (ref 7–15)
APPEARANCE UR: CLEAR
BASOPHILS # BLD AUTO: 0.1 10E3/UL (ref 0–0.2)
BASOPHILS NFR BLD AUTO: 1 %
BILIRUB UR QL STRIP: NEGATIVE
BUN SERPL-MCNC: 20.4 MG/DL (ref 6–20)
CALCIUM SERPL-MCNC: 9.3 MG/DL (ref 8.8–10.4)
CHLORIDE SERPL-SCNC: 103 MMOL/L (ref 98–107)
COLOR UR AUTO: ABNORMAL
CREAT SERPL-MCNC: 1.11 MG/DL (ref 0.67–1.17)
EGFRCR SERPLBLD CKD-EPI 2021: 77 ML/MIN/1.73M2
EOSINOPHIL # BLD AUTO: 0.2 10E3/UL (ref 0–0.7)
EOSINOPHIL NFR BLD AUTO: 3 %
ERYTHROCYTE [DISTWIDTH] IN BLOOD BY AUTOMATED COUNT: 12.5 % (ref 10–15)
GLUCOSE SERPL-MCNC: 184 MG/DL (ref 70–99)
GLUCOSE UR STRIP-MCNC: 500 MG/DL
HCO3 SERPL-SCNC: 24 MMOL/L (ref 22–29)
HCT VFR BLD AUTO: 45.9 % (ref 40–53)
HGB BLD-MCNC: 16 G/DL (ref 13.3–17.7)
HGB UR QL STRIP: ABNORMAL
HYALINE CASTS: 2 /LPF
IMM GRANULOCYTES # BLD: 0 10E3/UL
IMM GRANULOCYTES NFR BLD: 0 %
KETONES UR STRIP-MCNC: 20 MG/DL
LEUKOCYTE ESTERASE UR QL STRIP: NEGATIVE
LYMPHOCYTES # BLD AUTO: 3.4 10E3/UL (ref 0.8–5.3)
LYMPHOCYTES NFR BLD AUTO: 36 %
MCH RBC QN AUTO: 31.1 PG (ref 26.5–33)
MCHC RBC AUTO-ENTMCNC: 34.9 G/DL (ref 31.5–36.5)
MCV RBC AUTO: 89 FL (ref 78–100)
MONOCYTES # BLD AUTO: 0.8 10E3/UL (ref 0–1.3)
MONOCYTES NFR BLD AUTO: 9 %
MUCOUS THREADS #/AREA URNS LPF: PRESENT /LPF
NEUTROPHILS # BLD AUTO: 4.8 10E3/UL (ref 1.6–8.3)
NEUTROPHILS NFR BLD AUTO: 51 %
NITRATE UR QL: NEGATIVE
NRBC # BLD AUTO: 0 10E3/UL
NRBC BLD AUTO-RTO: 0 /100
PH UR STRIP: 5.5 [PH] (ref 5–9)
PLATELET # BLD AUTO: 216 10E3/UL (ref 150–450)
POTASSIUM SERPL-SCNC: 3.6 MMOL/L (ref 3.4–5.3)
RBC # BLD AUTO: 5.14 10E6/UL (ref 4.4–5.9)
RBC URINE: 31 /HPF
SODIUM SERPL-SCNC: 140 MMOL/L (ref 135–145)
SP GR UR STRIP: 1.03 (ref 1–1.03)
UROBILINOGEN UR STRIP-MCNC: NORMAL MG/DL
WBC # BLD AUTO: 9.3 10E3/UL (ref 4–11)
WBC URINE: <1 /HPF

## 2024-12-19 PROCEDURE — 96374 THER/PROPH/DIAG INJ IV PUSH: CPT | Performed by: FAMILY MEDICINE

## 2024-12-19 PROCEDURE — 74176 CT ABD & PELVIS W/O CONTRAST: CPT

## 2024-12-19 PROCEDURE — 36415 COLL VENOUS BLD VENIPUNCTURE: CPT | Performed by: FAMILY MEDICINE

## 2024-12-19 PROCEDURE — 250N000011 HC RX IP 250 OP 636: Performed by: FAMILY MEDICINE

## 2024-12-19 PROCEDURE — 85018 HEMOGLOBIN: CPT | Performed by: FAMILY MEDICINE

## 2024-12-19 PROCEDURE — 96375 TX/PRO/DX INJ NEW DRUG ADDON: CPT | Performed by: FAMILY MEDICINE

## 2024-12-19 PROCEDURE — 80048 BASIC METABOLIC PNL TOTAL CA: CPT | Performed by: FAMILY MEDICINE

## 2024-12-19 PROCEDURE — 99284 EMERGENCY DEPT VISIT MOD MDM: CPT | Performed by: FAMILY MEDICINE

## 2024-12-19 PROCEDURE — 250N000013 HC RX MED GY IP 250 OP 250 PS 637: Performed by: FAMILY MEDICINE

## 2024-12-19 PROCEDURE — 81001 URINALYSIS AUTO W/SCOPE: CPT | Performed by: FAMILY MEDICINE

## 2024-12-19 PROCEDURE — 99285 EMERGENCY DEPT VISIT HI MDM: CPT | Mod: 25 | Performed by: FAMILY MEDICINE

## 2024-12-19 PROCEDURE — 96376 TX/PRO/DX INJ SAME DRUG ADON: CPT | Performed by: FAMILY MEDICINE

## 2024-12-19 PROCEDURE — 82310 ASSAY OF CALCIUM: CPT | Performed by: FAMILY MEDICINE

## 2024-12-19 PROCEDURE — 85004 AUTOMATED DIFF WBC COUNT: CPT | Performed by: FAMILY MEDICINE

## 2024-12-19 RX ORDER — OXYCODONE AND ACETAMINOPHEN 5; 325 MG/1; MG/1
1 TABLET ORAL EVERY 6 HOURS PRN
Qty: 6 TABLET | Refills: 0 | Status: SHIPPED | OUTPATIENT
Start: 2024-12-19

## 2024-12-19 RX ORDER — KETOROLAC TROMETHAMINE 30 MG/ML
30 INJECTION, SOLUTION INTRAMUSCULAR; INTRAVENOUS ONCE
Status: COMPLETED | OUTPATIENT
Start: 2024-12-19 | End: 2024-12-19

## 2024-12-19 RX ORDER — TAMSULOSIN HYDROCHLORIDE 0.4 MG/1
0.4 CAPSULE ORAL ONCE
Status: COMPLETED | OUTPATIENT
Start: 2024-12-19 | End: 2024-12-19

## 2024-12-19 RX ORDER — ONDANSETRON 2 MG/ML
4 INJECTION INTRAMUSCULAR; INTRAVENOUS
Status: ACTIVE | OUTPATIENT
Start: 2024-12-19

## 2024-12-19 RX ORDER — TAMSULOSIN HYDROCHLORIDE 0.4 MG/1
0.4 CAPSULE ORAL DAILY
Qty: 7 CAPSULE | Refills: 0 | Status: SHIPPED | OUTPATIENT
Start: 2024-12-19

## 2024-12-19 RX ADMIN — TAMSULOSIN HYDROCHLORIDE 0.4 MG: 0.4 CAPSULE ORAL at 21:45

## 2024-12-19 RX ADMIN — KETOROLAC TROMETHAMINE 30 MG: 30 INJECTION, SOLUTION INTRAMUSCULAR at 18:34

## 2024-12-19 RX ADMIN — HYDROMORPHONE HYDROCHLORIDE 1 MG: 1 INJECTION, SOLUTION INTRAMUSCULAR; INTRAVENOUS; SUBCUTANEOUS at 18:36

## 2024-12-19 RX ADMIN — HYDROMORPHONE HYDROCHLORIDE 1 MG: 1 INJECTION, SOLUTION INTRAMUSCULAR; INTRAVENOUS; SUBCUTANEOUS at 21:04

## 2024-12-19 RX ADMIN — ONDANSETRON 4 MG: 2 INJECTION INTRAMUSCULAR; INTRAVENOUS at 18:34

## 2024-12-19 ASSESSMENT — ACTIVITIES OF DAILY LIVING (ADL)
ADLS_ACUITY_SCORE: 41

## 2024-12-19 ASSESSMENT — COLUMBIA-SUICIDE SEVERITY RATING SCALE - C-SSRS
1. IN THE PAST MONTH, HAVE YOU WISHED YOU WERE DEAD OR WISHED YOU COULD GO TO SLEEP AND NOT WAKE UP?: NO
2. HAVE YOU ACTUALLY HAD ANY THOUGHTS OF KILLING YOURSELF IN THE PAST MONTH?: NO
6. HAVE YOU EVER DONE ANYTHING, STARTED TO DO ANYTHING, OR PREPARED TO DO ANYTHING TO END YOUR LIFE?: NO

## 2024-12-19 ASSESSMENT — ENCOUNTER SYMPTOMS
BACK PAIN: 1
FLANK PAIN: 1

## 2024-12-20 VITALS
HEART RATE: 63 BPM | DIASTOLIC BLOOD PRESSURE: 73 MMHG | WEIGHT: 153 LBS | BODY MASS INDEX: 25.27 KG/M2 | OXYGEN SATURATION: 99 % | RESPIRATION RATE: 14 BRPM | TEMPERATURE: 98 F | SYSTOLIC BLOOD PRESSURE: 130 MMHG

## 2024-12-20 PROCEDURE — 250N000011 HC RX IP 250 OP 636: Performed by: FAMILY MEDICINE

## 2024-12-20 RX ORDER — ONDANSETRON 4 MG/1
4 TABLET, ORALLY DISINTEGRATING ORAL ONCE
Status: COMPLETED | OUTPATIENT
Start: 2024-12-20 | End: 2024-12-20

## 2024-12-20 RX ADMIN — ONDANSETRON 4 MG: 4 TABLET, ORALLY DISINTEGRATING ORAL at 01:17

## 2024-12-20 ASSESSMENT — ACTIVITIES OF DAILY LIVING (ADL): ADLS_ACUITY_SCORE: 41

## 2024-12-20 NOTE — DISCHARGE INSTRUCTIONS
You have a small kidney stone.  It is near the bladder so I expect you to pass this on your own in the next day or 2.  I have given you Flomax and pain medicine to use at home.  Pain medicine can be considered potentially addictive.  It can cause constipation.  Use a stool softener as needed.  If any nausea or vomiting, increased fever or other new concerns, inability to pass urine please return to the emergency room.

## 2024-12-20 NOTE — ED TRIAGE NOTES
Patient reports pain starting in left flank area radiating to abdomen. States that it started 20 minutes ago. Describes it as a terrible cramp. No changes bowel or bladder.      Triage Assessment (Adult)       Row Name 12/19/24 1823          Triage Assessment    Airway WDL WDL        Respiratory WDL    Respiratory WDL WDL        Skin Circulation/Temperature WDL    Skin Circulation/Temperature WDL WDL        Cardiac WDL    Cardiac WDL WDL        Peripheral/Neurovascular WDL    Peripheral Neurovascular WDL WDL        Cognitive/Neuro/Behavioral WDL    Cognitive/Neuro/Behavioral WDL WDL        Paul Coma Scale    Best Eye Response 4-->(E4) spontaneous     Best Motor Response 6-->(M6) obeys commands     Best Verbal Response 5-->(V5) oriented     Paul Coma Scale Score 15

## 2024-12-20 NOTE — ED PROVIDER NOTES
History     Chief Complaint   Patient presents with    Flank Pain     The history is provided by the patient.     Richard Knutson is a 59 year old male who had onset of left sided back pain while sitting at his desk in a meeting at 5 PM. Pain was up to 10 of 10 and he could not find a comfortable position. The pain radiated from his back to the left side of his abdomen and radiated down to the groin. He had hot flashes and nausea, no vomiting, no fever. He did urinate but there was no visible blood in the urine.     No PH or FH of ureteral stones. He has no record of abdominal CT on his chart.    Allergies:  Allergies   Allergen Reactions    Food Anaphylaxis     Peanuts, raw vegetables (carrots), and apples, plums, anything with a skin on it.    Other Environmental Allergy      Other Reaction(s): Sneezing    Hay fever, and pollens in the Spring.    Apple Juice     Other [Seasonal Allergies]      Raw vegetables and anything with a skin.    Peanuts [Nuts]     Plum Pulp      Plums    Lisinopril Other (See Comments)     Cysts; headaches    Statins Other (See Comments)     Statin Intolerance Documentation   2. Shared decision making discussion with patient regarding statins and patient chooses to not trial a second statin.   Elevated liver enzymes       Problem List:    Patient Active Problem List    Diagnosis Date Noted    Type 2 diabetes mellitus without complication, without long-term current use of insulin (H) 04/11/2019     Priority: Medium    Allergic rhinitis 02/08/2018     Priority: Medium    Personal history of other mental disorder 02/08/2018     Priority: Medium    History of migraine headaches 01/14/2016     Priority: Medium    BPPV (benign paroxysmal positional vertigo), left 01/14/2016     Priority: Medium    ASNHL (asymmetrical sensorineural hearing loss) 01/14/2016     Priority: Medium    Severe motion sickness, initial encounter 01/14/2016     Priority: Medium        Past Medical History:    Past  Medical History:   Diagnosis Date    Dysphagia 8/4/14    Epigastric pain 8/4/14    Gastritis 8/4/14    Hiatal hernia 8/4/14       Past Surgical History:    History reviewed. No pertinent surgical history.    Family History:    Family History   Problem Relation Age of Onset    Heart Disease Father     Colon Cancer Brother        Social History:  Marital Status:   [4]  Social History     Tobacco Use    Smoking status: Never    Smokeless tobacco: Never   Vaping Use    Vaping status: Never Used   Substance Use Topics    Alcohol use: Not Currently    Drug use: Never        Medications:    albuterol (PROAIR HFA/PROVENTIL HFA/VENTOLIN HFA) 108 (90 Base) MCG/ACT inhaler  benzonatate (TESSALON) 100 MG capsule  Cholecalciferol (VITAMIN D) 50 MCG (2000 UT) CAPS  IBUPROFEN PO  Multiple Vitamin (MULTIVITAMIN ADULT PO)  Omega-3 Fatty Acids (FISH OIL) 1200 MG capsule  scopolamine (TRANSDERM) (1.5mg base/3day) patch          Review of Systems   Genitourinary:  Positive for flank pain.   Musculoskeletal:  Positive for back pain.       Physical Exam   BP: (!) 143/80  Pulse: 59  Temp: 98  F (36.7  C)  Resp: 18  Weight: 69.4 kg (153 lb)  SpO2: 99 %      Physical Exam  Vitals and nursing note reviewed.   Constitutional:       General: He is in acute distress.      Appearance: He is ill-appearing.   Cardiovascular:      Rate and Rhythm: Normal rate and regular rhythm.      Pulses: Normal pulses.      Heart sounds: Normal heart sounds.   Pulmonary:      Effort: Pulmonary effort is normal.      Breath sounds: Normal breath sounds.   Abdominal:      General: Bowel sounds are normal. There is no distension.      Palpations: Abdomen is soft.      Tenderness: There is abdominal tenderness. There is left CVA tenderness and guarding.   Skin:     General: Skin is warm and dry.   Neurological:      General: No focal deficit present.      Mental Status: He is alert and oriented to person, place, and time.       Results for orders placed or  performed during the hospital encounter of 12/19/24 (from the past 24 hours)   CBC with platelets differential    Narrative    The following orders were created for panel order CBC with platelets differential.  Procedure                               Abnormality         Status                     ---------                               -----------         ------                     CBC with platelets and d...[[id number]]                      Final result                 Please view results for these tests on the individual orders.   Basic metabolic panel   Result Value Ref Range    Sodium 140 135 - 145 mmol/L    Potassium 3.6 3.4 - 5.3 mmol/L    Chloride 103 98 - 107 mmol/L    Carbon Dioxide (CO2) 24 22 - 29 mmol/L    Anion Gap 13 7 - 15 mmol/L    Urea Nitrogen 20.4 (H) 6.0 - 20.0 mg/dL    Creatinine 1.11 0.67 - 1.17 mg/dL    GFR Estimate 77 >60 mL/min/1.73m2    Calcium 9.3 8.8 - 10.4 mg/dL    Glucose 184 (H) 70 - 99 mg/dL   CBC with platelets and differential   Result Value Ref Range    WBC Count 9.3 4.0 - 11.0 10e3/uL    RBC Count 5.14 4.40 - 5.90 10e6/uL    Hemoglobin 16.0 13.3 - 17.7 g/dL    Hematocrit 45.9 40.0 - 53.0 %    MCV 89 78 - 100 fL    MCH 31.1 26.5 - 33.0 pg    MCHC 34.9 31.5 - 36.5 g/dL    RDW 12.5 10.0 - 15.0 %    Platelet Count 216 150 - 450 10e3/uL    % Neutrophils 51 %    % Lymphocytes 36 %    % Monocytes 9 %    % Eosinophils 3 %    % Basophils 1 %    % Immature Granulocytes 0 %    NRBCs per 100 WBC 0 <1 /100    Absolute Neutrophils 4.8 1.6 - 8.3 10e3/uL    Absolute Lymphocytes 3.4 0.8 - 5.3 10e3/uL    Absolute Monocytes 0.8 0.0 - 1.3 10e3/uL    Absolute Eosinophils 0.2 0.0 - 0.7 10e3/uL    Absolute Basophils 0.1 0.0 - 0.2 10e3/uL    Absolute Immature Granulocytes 0.0 <=0.4 10e3/uL    Absolute NRBCs 0.0 10e3/uL       Medications   HYDROmorphone (DILAUDID) injection 1 mg (1 mg Intravenous $Given 12/19/24 1836)   ondansetron (ZOFRAN) injection 4 mg (4 mg Intravenous $Given 12/19/24 1834)   ketorolac  (TORADOL) injection 30 mg (30 mg Intravenous $Given 12/19/24 1834)       Assessments & Plan (with Medical Decision Making)  Richard Knutson is a 59 year old male who had onset of left sided back pain while sitting at his desk in a meeting at 5 PM. Pain was up to 10 of 10 and he could not find a comfortable position. The pain radiated from his back to the left side of his abdomen and radiated down to the groin. He had hot flashes and nausea, no vomiting, no fever. He did urinate but there was no visible blood in the urine.   No PH or FH of ureteral stones. He has no record of abdominal CT on his chart.  VS in the ED  Patient Vitals for the past 24 hrs:   BP Temp Temp src Pulse Resp SpO2 Weight   12/19/24 1900 136/84 -- -- 69 -- -- --   12/19/24 1845 134/78 -- -- 79 -- -- --   12/19/24 1836 (!) 141/98 -- -- 66 -- -- --   12/19/24 1822 (!) 143/80 98  F (36.7  C) Temporal 59 18 99 % 69.4 kg (153 lb)     Exam shows left sided CVA tenderness with radiation around the left side.   We gave Dilaudid, Toradol, Zofran.  Labs show CBC normal, BMP okay.  UA pending.   No imaging ordered by me.   7:16 PM  I am signing out his care to Dr Vee at change of shift.      I have reviewed the nursing notes.    I have reviewed the findings, diagnosis, plan and need for follow up with the patient.  Medical Decision Making  The patient's presentation was of moderate complexity (an undiagnosed new problem with uncertain prognosis).    The patient's evaluation involved:  an assessment requiring an independent historian (see separate area of note for details)  ordering and/or review of 3+ test(s) in this encounter (see separate area of note for details)    The patient's management necessitated moderate risk (prescription drug management including medications given in the ED), high risk (a parenteral controlled substance), and further care after sign-out to Dr Vee (see their note for further management).    Final diagnoses:   Left flank  pain       12/19/2024   [facility], Nilesh Carrera MD  12/19/24 1917

## 2024-12-20 NOTE — ED PROVIDER NOTES
History     Chief Complaint   Patient presents with    Flank Pain     HPI  Richard Knutson is a 59 year old male who I assumed care of at shift change.  Patient presented for left-sided flank pain.  UA showed red blood cells.  CT was done and confirmed a ureteral stone at the junction of the bladder.  Patient's pain improved with Toradol and Dilaudid and fluids here.  Discussed plan of care.  I think he will pass this on his own based on the fact that it is only 3 mm in his right at the ureterovesical junction.  He was given Percocet and Flomax to use at home.  Strainer was given.  Referral to urology as needed.    Allergies:  Allergies   Allergen Reactions    Food Anaphylaxis     Peanuts, raw vegetables (carrots), and apples, plums, anything with a skin on it.    Other Environmental Allergy      Other Reaction(s): Sneezing    Hay fever, and pollens in the Spring.    Apple Juice     Other [Seasonal Allergies]      Raw vegetables and anything with a skin.    Peanuts [Nuts]     Plum Pulp      Plums    Lisinopril Other (See Comments)     Cysts; headaches    Statins Other (See Comments)     Statin Intolerance Documentation   2. Shared decision making discussion with patient regarding statins and patient chooses to not trial a second statin.   Elevated liver enzymes       Problem List:    Patient Active Problem List    Diagnosis Date Noted    Type 2 diabetes mellitus without complication, without long-term current use of insulin (H) 04/11/2019     Priority: Medium    Allergic rhinitis 02/08/2018     Priority: Medium    Personal history of other mental disorder 02/08/2018     Priority: Medium    History of migraine headaches 01/14/2016     Priority: Medium    BPPV (benign paroxysmal positional vertigo), left 01/14/2016     Priority: Medium    ASNHL (asymmetrical sensorineural hearing loss) 01/14/2016     Priority: Medium    Severe motion sickness, initial encounter 01/14/2016     Priority: Medium        Past Medical  History:    Past Medical History:   Diagnosis Date    Dysphagia 8/4/14    Epigastric pain 8/4/14    Gastritis 8/4/14    Hiatal hernia 8/4/14       Past Surgical History:    History reviewed. No pertinent surgical history.    Family History:    Family History   Problem Relation Age of Onset    Heart Disease Father     Colon Cancer Brother        Social History:  Marital Status:   [4]  Social History     Tobacco Use    Smoking status: Never    Smokeless tobacco: Never   Vaping Use    Vaping status: Never Used   Substance Use Topics    Alcohol use: Not Currently    Drug use: Never        Medications:    oxyCODONE-acetaminophen (PERCOCET) 5-325 MG tablet  tamsulosin (FLOMAX) 0.4 MG capsule  albuterol (PROAIR HFA/PROVENTIL HFA/VENTOLIN HFA) 108 (90 Base) MCG/ACT inhaler  benzonatate (TESSALON) 100 MG capsule  Cholecalciferol (VITAMIN D) 50 MCG (2000 UT) CAPS  IBUPROFEN PO  Multiple Vitamin (MULTIVITAMIN ADULT PO)  Omega-3 Fatty Acids (FISH OIL) 1200 MG capsule  scopolamine (TRANSDERM) (1.5mg base/3day) patch          Review of Systems    Physical Exam   BP: (!) 143/80  Pulse: 59  Temp: 98  F (36.7  C)  Resp: 18  Weight: 69.4 kg (153 lb)  SpO2: 99 %      Physical Exam    ED Course     ED Course as of 12/19/24 2151   Thu Dec 19, 2024   1947 Sign out at shift change. Concern for kidney stone. Awaiting UA.      Procedures              Critical Care time:  none          Results for orders placed or performed during the hospital encounter of 12/19/24 (from the past 24 hours)   CBC with platelets differential    Narrative    The following orders were created for panel order CBC with platelets differential.  Procedure                               Abnormality         Status                     ---------                               -----------         ------                     CBC with platelets and d...[[id number]]                      Final result                 Please view results for these tests on the individual  orders.   Basic metabolic panel   Result Value Ref Range    Sodium 140 135 - 145 mmol/L    Potassium 3.6 3.4 - 5.3 mmol/L    Chloride 103 98 - 107 mmol/L    Carbon Dioxide (CO2) 24 22 - 29 mmol/L    Anion Gap 13 7 - 15 mmol/L    Urea Nitrogen 20.4 (H) 6.0 - 20.0 mg/dL    Creatinine 1.11 0.67 - 1.17 mg/dL    GFR Estimate 77 >60 mL/min/1.73m2    Calcium 9.3 8.8 - 10.4 mg/dL    Glucose 184 (H) 70 - 99 mg/dL   CBC with platelets and differential   Result Value Ref Range    WBC Count 9.3 4.0 - 11.0 10e3/uL    RBC Count 5.14 4.40 - 5.90 10e6/uL    Hemoglobin 16.0 13.3 - 17.7 g/dL    Hematocrit 45.9 40.0 - 53.0 %    MCV 89 78 - 100 fL    MCH 31.1 26.5 - 33.0 pg    MCHC 34.9 31.5 - 36.5 g/dL    RDW 12.5 10.0 - 15.0 %    Platelet Count 216 150 - 450 10e3/uL    % Neutrophils 51 %    % Lymphocytes 36 %    % Monocytes 9 %    % Eosinophils 3 %    % Basophils 1 %    % Immature Granulocytes 0 %    NRBCs per 100 WBC 0 <1 /100    Absolute Neutrophils 4.8 1.6 - 8.3 10e3/uL    Absolute Lymphocytes 3.4 0.8 - 5.3 10e3/uL    Absolute Monocytes 0.8 0.0 - 1.3 10e3/uL    Absolute Eosinophils 0.2 0.0 - 0.7 10e3/uL    Absolute Basophils 0.1 0.0 - 0.2 10e3/uL    Absolute Immature Granulocytes 0.0 <=0.4 10e3/uL    Absolute NRBCs 0.0 10e3/uL   UA with Microscopic reflex to Culture    Specimen: Urine, Clean Catch   Result Value Ref Range    Color Urine Light Yellow Colorless, Straw, Light Yellow, Yellow    Appearance Urine Clear Clear    Glucose Urine 500 (A) Negative mg/dL    Bilirubin Urine Negative Negative    Ketones Urine 20 (A) Negative mg/dL    Specific Gravity Urine 1.029 1.000 - 1.030    Blood Urine Large (A) Negative    pH Urine 5.5 5.0 - 9.0    Protein Albumin Urine 10 (A) Negative mg/dL    Urobilinogen Urine Normal Normal, 2.0 mg/dL    Nitrite Urine Negative Negative    Leukocyte Esterase Urine Negative Negative    Mucus Urine Present (A) None Seen /LPF    RBC Urine 31 (H) <=2 /HPF    WBC Urine <1 <=5 /HPF    Hyaline Casts Urine 2 <=2  /LPF    Narrative    Urine Culture not indicated   CT Abdomen Pelvis w/o Contrast    Narrative    EXAM: CT ABDOMEN PELVIS W/O CONTRAST  LOCATION: [facility]  DATE: 12/19/2024    INDICATION: hematuria, flank pain  COMPARISON: None.  TECHNIQUE: CT scan of the abdomen and pelvis was performed without IV contrast. Multiplanar reformats were obtained. Dose reduction techniques were used.  CONTRAST: None.    FINDINGS:   LOWER CHEST: Dependent atelectasis or scarring. Partially imaged coronary artery calcification.    HEPATOBILIARY: Diffuse hepatic steatosis. No significant mass. No bile duct dilatation. No calcified gallstones.    PANCREAS: Normal.    SPLEEN: Punctate calcified granulomas.    ADRENAL GLANDS: Normal.    KIDNEYS/BLADDER: 0.3 x 0.3 x 0.3 cm calculus at the left ureterovesical junction with mild upstream collecting system dilation and perinephric fat stranding.    BOWEL: Normal.    LYMPH NODES: Normal.    VASCULATURE: No abdominal aortic aneurysm. Mild atherosclerotic ossification in the aorta and iliofemoral arteries.    PELVIC ORGANS: Normal.    MUSCULOSKELETAL: Degenerative changes in the spine and right hip.      Impression    IMPRESSION:   1.  Obstructing 0.3 cm calculus at the left ureterovesical junction with mild upstream collecting system dilation.  2.  Hepatic steatosis.         Medications   HYDROmorphone (DILAUDID) injection 1 mg (1 mg Intravenous $Given 12/19/24 2104)   ondansetron (ZOFRAN) injection 4 mg (4 mg Intravenous $Given 12/19/24 1834)   ketorolac (TORADOL) injection 30 mg (30 mg Intravenous $Given 12/19/24 1834)   tamsulosin (FLOMAX) capsule 0.4 mg (0.4 mg Oral $Given 12/19/24 2145)       Assessments & Plan (with Medical Decision Making)     I have reviewed the nursing notes.    I have reviewed the findings, diagnosis, plan and need for follow up with the patient.           Medical Decision Making  The patient's presentation was of low complexity (an acute and  uncomplicated illness or injury).    The patient's evaluation involved:  ordering and/or review of 3+ test(s) in this encounter (see separate area of note for details)    The patient's management necessitated moderate risk (prescription drug management including medications given in the ED).        New Prescriptions    OXYCODONE-ACETAMINOPHEN (PERCOCET) 5-325 MG TABLET    Take 1 tablet by mouth every 6 hours as needed for severe pain.    TAMSULOSIN (FLOMAX) 0.4 MG CAPSULE    Take 1 capsule (0.4 mg) by mouth daily.       Final diagnoses:   Left flank pain   Ureterolithiasis   Suspect he will pass this on his own.  See AVS for details.    12/19/2024   [facility]       Samra Vee DO  12/19/24 [id number]

## 2025-03-09 ENCOUNTER — HEALTH MAINTENANCE LETTER (OUTPATIENT)
Age: 60
End: 2025-03-09

## 2025-05-06 ENCOUNTER — TRANSFERRED RECORDS (OUTPATIENT)
Dept: MULTI SPECIALTY CLINIC | Facility: CLINIC | Age: 60
End: 2025-05-06
Payer: COMMERCIAL

## 2025-05-06 LAB — RETINOPATHY: NEGATIVE

## 2025-05-14 ENCOUNTER — APPOINTMENT (OUTPATIENT)
Dept: LAB | Facility: CLINIC | Age: 60
End: 2025-05-14
Attending: FAMILY MEDICINE

## 2025-05-14 ENCOUNTER — APPOINTMENT (OUTPATIENT)
Dept: URGENT CARE | Facility: CLINIC | Age: 60
End: 2025-05-14

## 2025-05-14 DIAGNOSIS — Z77.011 LEAD EXPOSURE: ICD-10-CM

## 2025-05-14 PROCEDURE — 82300 ASSAY OF CADMIUM: CPT

## 2025-05-15 ENCOUNTER — APPOINTMENT (OUTPATIENT)
Dept: URGENT CARE | Facility: CLINIC | Age: 60
End: 2025-05-15

## 2025-05-18 LAB — CADMIUM BLD-MCNC: <0.5 UG/L (ref 0–1.2)

## 2025-06-28 ENCOUNTER — HEALTH MAINTENANCE LETTER (OUTPATIENT)
Age: 60
End: 2025-06-28

## 2025-07-19 ENCOUNTER — HEALTH MAINTENANCE LETTER (OUTPATIENT)
Age: 60
End: 2025-07-19

## (undated) RX ORDER — KETOROLAC TROMETHAMINE 30 MG/ML
INJECTION, SOLUTION INTRAMUSCULAR; INTRAVENOUS
Status: DISPENSED
Start: 2024-12-19

## (undated) RX ORDER — TAMSULOSIN HYDROCHLORIDE 0.4 MG/1
CAPSULE ORAL
Status: DISPENSED
Start: 2024-12-19

## (undated) RX ORDER — ONDANSETRON 4 MG/1
TABLET, ORALLY DISINTEGRATING ORAL
Status: DISPENSED
Start: 2024-12-20

## (undated) RX ORDER — ONDANSETRON 2 MG/ML
INJECTION INTRAMUSCULAR; INTRAVENOUS
Status: DISPENSED
Start: 2024-12-19